# Patient Record
Sex: FEMALE | Race: WHITE | NOT HISPANIC OR LATINO | Employment: UNEMPLOYED | URBAN - METROPOLITAN AREA
[De-identification: names, ages, dates, MRNs, and addresses within clinical notes are randomized per-mention and may not be internally consistent; named-entity substitution may affect disease eponyms.]

---

## 2019-12-19 ENCOUNTER — HOSPITAL ENCOUNTER (EMERGENCY)
Facility: MEDICAL CENTER | Age: 34
End: 2019-12-19
Attending: EMERGENCY MEDICINE
Payer: COMMERCIAL

## 2019-12-19 VITALS
RESPIRATION RATE: 16 BRPM | SYSTOLIC BLOOD PRESSURE: 161 MMHG | OXYGEN SATURATION: 99 % | DIASTOLIC BLOOD PRESSURE: 98 MMHG | BODY MASS INDEX: 32.84 KG/M2 | WEIGHT: 204.37 LBS | TEMPERATURE: 97.2 F | HEART RATE: 82 BPM | HEIGHT: 66 IN

## 2019-12-19 DIAGNOSIS — R42 LIGHTHEADEDNESS: ICD-10-CM

## 2019-12-19 LAB
ALBUMIN SERPL BCP-MCNC: 4.2 G/DL (ref 3.2–4.9)
ALBUMIN/GLOB SERPL: 1.2 G/DL
ALP SERPL-CCNC: 49 U/L (ref 30–99)
ALT SERPL-CCNC: 21 U/L (ref 2–50)
ANION GAP SERPL CALC-SCNC: 16 MMOL/L (ref 0–11.9)
AST SERPL-CCNC: 19 U/L (ref 12–45)
BASOPHILS # BLD AUTO: 0.5 % (ref 0–1.8)
BASOPHILS # BLD: 0.05 K/UL (ref 0–0.12)
BILIRUB SERPL-MCNC: 0.3 MG/DL (ref 0.1–1.5)
BUN SERPL-MCNC: 12 MG/DL (ref 8–22)
CALCIUM SERPL-MCNC: 9.1 MG/DL (ref 8.4–10.2)
CHLORIDE SERPL-SCNC: 102 MMOL/L (ref 96–112)
CO2 SERPL-SCNC: 23 MMOL/L (ref 20–33)
CREAT SERPL-MCNC: 0.75 MG/DL (ref 0.5–1.4)
EKG IMPRESSION: NORMAL
EOSINOPHIL # BLD AUTO: 0.08 K/UL (ref 0–0.51)
EOSINOPHIL NFR BLD: 0.8 % (ref 0–6.9)
ERYTHROCYTE [DISTWIDTH] IN BLOOD BY AUTOMATED COUNT: 40.1 FL (ref 35.9–50)
GLOBULIN SER CALC-MCNC: 3.5 G/DL (ref 1.9–3.5)
GLUCOSE SERPL-MCNC: 97 MG/DL (ref 65–99)
HCG SERPL QL: NEGATIVE
HCT VFR BLD AUTO: 43.1 % (ref 37–47)
HGB BLD-MCNC: 14 G/DL (ref 12–16)
IMM GRANULOCYTES # BLD AUTO: 0.04 K/UL (ref 0–0.11)
IMM GRANULOCYTES NFR BLD AUTO: 0.4 % (ref 0–0.9)
LYMPHOCYTES # BLD AUTO: 2.24 K/UL (ref 1–4.8)
LYMPHOCYTES NFR BLD: 21.1 % (ref 22–41)
MCH RBC QN AUTO: 28.8 PG (ref 27–33)
MCHC RBC AUTO-ENTMCNC: 32.5 G/DL (ref 33.6–35)
MCV RBC AUTO: 88.7 FL (ref 81.4–97.8)
MONOCYTES # BLD AUTO: 0.72 K/UL (ref 0–0.85)
MONOCYTES NFR BLD AUTO: 6.8 % (ref 0–13.4)
NEUTROPHILS # BLD AUTO: 7.47 K/UL (ref 2–7.15)
NEUTROPHILS NFR BLD: 70.4 % (ref 44–72)
NRBC # BLD AUTO: 0 K/UL
NRBC BLD-RTO: 0 /100 WBC
PLATELET # BLD AUTO: 347 K/UL (ref 164–446)
PMV BLD AUTO: 10 FL (ref 9–12.9)
POTASSIUM SERPL-SCNC: 3.7 MMOL/L (ref 3.6–5.5)
PROT SERPL-MCNC: 7.7 G/DL (ref 6–8.2)
RBC # BLD AUTO: 4.86 M/UL (ref 4.2–5.4)
SODIUM SERPL-SCNC: 141 MMOL/L (ref 135–145)
WBC # BLD AUTO: 10.6 K/UL (ref 4.8–10.8)

## 2019-12-19 PROCEDURE — 85025 COMPLETE CBC W/AUTO DIFF WBC: CPT

## 2019-12-19 PROCEDURE — 36415 COLL VENOUS BLD VENIPUNCTURE: CPT

## 2019-12-19 PROCEDURE — 99284 EMERGENCY DEPT VISIT MOD MDM: CPT

## 2019-12-19 PROCEDURE — 84703 CHORIONIC GONADOTROPIN ASSAY: CPT

## 2019-12-19 PROCEDURE — 80053 COMPREHEN METABOLIC PANEL: CPT

## 2019-12-19 PROCEDURE — 93005 ELECTROCARDIOGRAM TRACING: CPT | Performed by: EMERGENCY MEDICINE

## 2019-12-19 PROCEDURE — 93005 ELECTROCARDIOGRAM TRACING: CPT

## 2019-12-19 SDOH — HEALTH STABILITY: MENTAL HEALTH: HOW OFTEN DO YOU HAVE A DRINK CONTAINING ALCOHOL?: MONTHLY OR LESS

## 2019-12-19 NOTE — ED NOTES
Pt given written and oral dc instructions. Pt verbalized understanding of all instructions given. All questions answered. VSS. IV removed. Pt given fu instructions and educated on s/s of when to return to the ER. Pt ambulating independently upon time of dc in stable condition.

## 2019-12-19 NOTE — ED PROVIDER NOTES
ED Provider Note    ED Provider Note    Primary care provider: No primary care provider on file.  Means of arrival: walk in  History obtained from: Patient    CHIEF COMPLAINT  Chief Complaint   Patient presents with   • Dizziness     Pt reports dizzyness and lightheadedness that started yesterday; pt states she had near syncopal event today   • Near Syncopal     Seen at 2:07 PM.   HPI  Miriam Cabrera is a 34 y.o. female who presents to the Emergency Department with episodes of lightheadedness.  Yesterday she had a brief episode of vertigo, described as room spinning.  Today she has had more lightheadedness as if she was going to pass out.  She has never had this in the past.  She feels that maybe she is dehydrated although she has been drinking a lot of water lately.  She does have a family history of diabetes but has never personally been diagnosed with diabetes.    She has been on OCPs for the past 4 months due to heavy menstrual cycles.  She continues to have irregular heavy menstrual cycles.  She noted a gradual onset of mild headache earlier today that is since resolved.  She denies any numbness, weakness, neck pain, visual changes, current headache, nausea, vomiting, chest pain, shortness of breath, abdominal pain.  She feels hot at times but has not had a fever.    REVIEW OF SYSTEMS  See HPI,   Remainder of ROS negative.     PAST MEDICAL HISTORY   Denies.    SURGICAL HISTORY  patient denies any surgical history    SOCIAL HISTORY  Social History     Tobacco Use   • Smoking status: Never Smoker   • Smokeless tobacco: Never Used   Substance Use Topics   • Alcohol use: Yes     Frequency: Monthly or less     Comment: socially   • Drug use: Never      Social History     Substance and Sexual Activity   Drug Use Never       FAMILY HISTORY  History reviewed. No pertinent family history.    CURRENT MEDICATIONS  Reviewed.  See Encounter Summary.     ALLERGIES  No Known Allergies    PHYSICAL EXAM  VITAL SIGNS: BP (!)  "161/98   Pulse 82   Temp 36.2 °C (97.2 °F)   Resp 16   Ht 1.676 m (5' 6\")   Wt 92.7 kg (204 lb 5.9 oz)   SpO2 99%   BMI 32.99 kg/m²   Constitutional: Awake, alert in no apparent distress.  HENT: Normocephalic, Bilateral external ears normal. Nose normal.   Eyes: Conjunctiva normal, non-icteric, EOMI.    Thorax & Lungs: Easy unlabored respirations, Clear to ascultation bilaterally.  Cardiovascular: Regular rate, Regular rhythm, No murmurs, rubs or gallops.  Abdomen:  Soft, nontender, nondistended, normal active bowel sounds.   :    Skin: Visualized skin is  Dry, No erythema, No rash.   Musculoskeletal:   No cyanosis, clubbing or edema.  Neurologic: Alert, Grossly non-focal.   Psychiatric: Normal affect, Normal mood  Lymphatic:  No cervical LAD    EKG   12 lead Interpreted by me  Rhythm:  Normal sinus rhythm   Rate: 82  Axis: normal  Ectopy: none  Conduction: normal  ST Segments: no acute change  T Waves: no acute change  Clinical Impression: Normal EKG without acute changes     RADIOLOGY  No orders to display         COURSE & MEDICAL DECISION MAKING  Pertinent Labs & Imaging studies reviewed. (See chart for details)    Differential diagnoses include but are not limited to: Dehydration, new onset diabetes, symptomatic anemia    2:07 PM - Medical record reviewed, no prior visits.     Decision Making:  This is a 34 y.o. year old female who presents with lightheadedness.  She initially had an episode of vertigo yesterday but none today.  The patient is neurologically intact.  She mostly complains of just lightheadedness/near syncope.  EKG is normal, she does not have any LVH or Brugada syndrome.  No QT prolongation.  She is hemodynamically stable, she is hypertensive but this should not cause near syncopal events.  Hemoglobin is normal, the patient does not have any leukocytosis or leftward shift, no signs of sepsis.  The patient is not pregnant.  She has normal renal function as well without " hyperglycemia.    At this point the cause of the patient's lightheadedness is not clear, possibly due to some mild dehydration.  She is on OCPs so I considered pulmonary embolus but the patient does not have any chest pain or shortness of breath, vitals again are reassuring.  I feel that pulmonary embolus is extremely unlikely at this time and do not feel that requires further investigation based on the presentation today.  I explained that if the patient has any syncope she needs to be reevaluated.    Discharge Medications:  There are no discharge medications for this patient.      The patient was discharged home (see d/c instructions) and parent was told to return immediately for any signs or symptoms listed, or any worsening at all.  The patient's parent verbally agreed to the discharge precautions and follow-up plan which is documented in EPIC.    The patient's blood pressure is elevated today. >120/80. I have referred them to primary care for follow up.       FINAL IMPRESSION  1. Lightheadedness

## 2019-12-19 NOTE — ED TRIAGE NOTES
"Miriam Cabrera 34 y.o. female   Chief Complaint   Patient presents with   • Dizziness     Pt reports dizzyness and lightheadedness that started yesterday; pt states she had near syncopal event today   • Near Syncopal     /109   Pulse 90   Temp 36.6 °C (97.9 °F) (Temporal)   Resp 18   Ht 1.676 m (5' 6\")   Wt 92.7 kg (204 lb 5.9 oz)   SpO2 97%   BMI 32.99 kg/m²     Pt returned to [facility] and educated on triage process. Advised to notify RN with changes or concerns.   Denies chest pain, sob. Reports a headache since she arrived to ER.   "

## 2020-08-28 ENCOUNTER — APPOINTMENT (OUTPATIENT)
Dept: RADIOLOGY | Facility: IMAGING CENTER | Age: 35
End: 2020-08-28
Attending: PHYSICIAN ASSISTANT
Payer: MEDICAID

## 2020-08-28 ENCOUNTER — OFFICE VISIT (OUTPATIENT)
Dept: URGENT CARE | Facility: CLINIC | Age: 35
End: 2020-08-28
Payer: MEDICAID

## 2020-08-28 VITALS
OXYGEN SATURATION: 97 % | SYSTOLIC BLOOD PRESSURE: 132 MMHG | HEIGHT: 66 IN | HEART RATE: 103 BPM | TEMPERATURE: 96.8 F | RESPIRATION RATE: 14 BRPM | DIASTOLIC BLOOD PRESSURE: 92 MMHG | WEIGHT: 200 LBS | BODY MASS INDEX: 32.14 KG/M2

## 2020-08-28 DIAGNOSIS — M10.9 ACUTE GOUT OF RIGHT HAND, UNSPECIFIED CAUSE: ICD-10-CM

## 2020-08-28 DIAGNOSIS — M79.641 RIGHT HAND PAIN: ICD-10-CM

## 2020-08-28 PROCEDURE — 99203 OFFICE O/P NEW LOW 30 MIN: CPT | Performed by: PHYSICIAN ASSISTANT

## 2020-08-28 PROCEDURE — 73130 X-RAY EXAM OF HAND: CPT | Mod: TC,RT | Performed by: PHYSICIAN ASSISTANT

## 2020-08-28 RX ORDER — METHYLPREDNISOLONE 4 MG/1
TABLET ORAL
Qty: 21 TAB | Refills: 0 | Status: SHIPPED | OUTPATIENT
Start: 2020-08-28 | End: 2022-04-04

## 2020-08-28 RX ORDER — INDOMETHACIN 50 MG/1
50 CAPSULE ORAL 3 TIMES DAILY
Qty: 60 CAP | Refills: 0 | Status: SHIPPED | OUTPATIENT
Start: 2020-08-28 | End: 2022-04-04

## 2020-08-28 ASSESSMENT — ENCOUNTER SYMPTOMS
DIAPHORESIS: 0
DIARRHEA: 0
HEADACHES: 0
FEVER: 0
VOMITING: 0
PALPITATIONS: 0
ABDOMINAL PAIN: 0
CHILLS: 0
COUGH: 0
SHORTNESS OF BREATH: 0
MYALGIAS: 0
SINUS PAIN: 0
TINGLING: 0
WEIGHT LOSS: 0
BLURRED VISION: 0
DIZZINESS: 0
SORE THROAT: 0
NAUSEA: 0
WHEEZING: 0

## 2020-08-28 ASSESSMENT — FIBROSIS 4 INDEX: FIB4 SCORE: 0.41

## 2020-08-28 NOTE — PROGRESS NOTES
Subjective:   Miriam Cabrera is a 35 y.o. female who presents for Other (right hand stiffness, x yesterday maybe for 4am started walking up in pain  )    HPI:  This is a very pleasant 35-year-old female presenting to the clinic with right hand pain and stiffness starting at 4 AM yesterday.  Patient denies any traumatic injury or inciting event.  The only event she can think of is she was playing horse shoes the day before however she did not feel any pain while playing.  She describes waking up at 4 AM yesterday with pain and swelling to her right second MCP joint.  She denies any loss of range of motion however she states it feels stiff to move.  She says she has mild associated swelling has not noticed any redness or bruising.  Pain is made worse with any pressure over her second MCP joint.  She denies any tingling or loss of sensation to the hand or digit.  She has tried ice with minimal relief.  She denies any previous injury.  She denies any past medical history significant for gout or arthritis.  She states she has had no change in her diet however she has been losing a lot of weight lately.    Review of Systems   Constitutional: Negative for chills, diaphoresis, fever, malaise/fatigue and weight loss.   HENT: Negative for congestion, sinus pain and sore throat.    Eyes: Negative for blurred vision.   Respiratory: Negative for cough, shortness of breath and wheezing.    Cardiovascular: Negative for chest pain and palpitations.   Gastrointestinal: Negative for abdominal pain, diarrhea, nausea and vomiting.   Musculoskeletal: Positive for joint pain (Right second MCP joint.  No trauma or inciting event.). Negative for myalgias.   Skin: Negative for rash.   Neurological: Negative for dizziness, tingling and headaches.       Medications:    • This patient does not have an active medication from one of the medication groupers.    Allergies: Patient has no known allergies.    Problem List: Miriam Cabrera does not  "have a problem list on file.    Surgical History:  No past surgical history on file.    Past Social Hx: Miriam Cabrera  reports that she has never smoked. She has never used smokeless tobacco. She reports current alcohol use. She reports that she does not use drugs.     Past Family Hx:  Miriam Cabrera family history is not on file.     Problem list, medications, and allergies reviewed by myself today in Epic.     Objective:     /92   Pulse (!) 103   Temp 36 °C (96.8 °F) (Temporal)   Resp 14   Ht 1.676 m (5' 6\")   Wt 90.7 kg (200 lb)   SpO2 97%   BMI 32.28 kg/m²     Physical Exam  Constitutional:       General: She is not in acute distress.     Appearance: Normal appearance. She is not ill-appearing, toxic-appearing or diaphoretic.   HENT:      Head: Normocephalic and atraumatic.   Eyes:      Conjunctiva/sclera: Conjunctivae normal.   Cardiovascular:      Rate and Rhythm: Normal rate and regular rhythm.      Pulses: Normal pulses.      Heart sounds: Normal heart sounds.   Pulmonary:      Effort: Pulmonary effort is normal.      Breath sounds: Normal breath sounds. No wheezing.   Musculoskeletal: Normal range of motion.      Right hand: She exhibits tenderness, bony tenderness (Over the second MCP joint.) and swelling. She exhibits normal range of motion and normal capillary refill. Decreased sensation noted. Normal strength noted.        Hands:       Comments: Very minimal overlying erythema to the right hand her second MCP joint.  No ascending redness.  No signs of infection.  No tenderness to palpation over the distal portion of the phalanx or the metacarpal.   Skin:     Capillary Refill: Capillary refill takes less than 2 seconds.   Neurological:      General: No focal deficit present.      Mental Status: She is alert and oriented to person, place, and time. Mental status is at baseline.       X-ray right hand:  FINDINGS: Bone mineralization is normal. There is no evidence of fracture or " dislocation. Soft tissues are normal.     IMPRESSION:  No evidence of acute fracture or dislocation.    Assessment/Plan:     Diagnosis and associated orders:   1. Right hand pain    - DX-HAND 3+ RIGHT; Future  - methylPREDNISolone (MEDROL DOSEPAK) 4 MG Tablet Therapy Pack; Follow schedule on package instructions.  Dispense: 21 Tab; Refill: 0  - indomethacin (INDOCIN) 50 MG Cap; Take 1 Cap by mouth 3 times a day.  Dispense: 60 Cap; Refill: 0    2. Acute gout of right hand, unspecified cause    - methylPREDNISolone (MEDROL DOSEPAK) 4 MG Tablet Therapy Pack; Follow schedule on package instructions.  Dispense: 21 Tab; Refill: 0  - indomethacin (INDOCIN) 50 MG Cap; Take 1 Cap by mouth 3 times a day.  Dispense: 60 Cap; Refill: 0       Comments/MDM:     Differential diagnoses and treatment options were discussed with the patient at length today.  At this time differentials include acute gout flare versus overuse injury versus muscle strain versus arthritis.  X-rays performed in clinic reveal no acute fracture, bony abnormality or dislocation.  We will begin treatment with anti-inflammatories.  I recommended elevation and ice as much as possible.  Discussed potential red flag symptoms and signs of underlying infection.  If any of these present return to the clinic or nearest emergency department for further work-up and evaluation.           Differential diagnosis, natural history, supportive care, and indications for immediate follow-up discussed.    Advised the patient to follow-up with the primary care physician for recheck, reevaluation, and consideration of further management.    Please note that this dictation was created using voice recognition software. I have made reasonable attempt to correct obvious errors, but I expect that there are errors of grammar and possibly content that I did not discover before finalizing the note.    This note was electronically signed by BIBIANA Woodward PA-C

## 2020-10-03 ENCOUNTER — TELEMEDICINE (OUTPATIENT)
Dept: TELEHEALTH | Facility: TELEMEDICINE | Age: 35
End: 2020-10-03
Payer: MEDICAID

## 2020-10-03 VITALS — WEIGHT: 203 LBS | HEIGHT: 66 IN | BODY MASS INDEX: 32.62 KG/M2

## 2020-10-03 DIAGNOSIS — K04.7 DENTAL INFECTION: ICD-10-CM

## 2020-10-03 PROCEDURE — 99213 OFFICE O/P EST LOW 20 MIN: CPT | Mod: 95,CR | Performed by: NURSE PRACTITIONER

## 2020-10-03 RX ORDER — AMOXICILLIN AND CLAVULANATE POTASSIUM 875; 125 MG/1; MG/1
1 TABLET, FILM COATED ORAL 2 TIMES DAILY
Qty: 20 TAB | Refills: 0 | Status: SHIPPED | OUTPATIENT
Start: 2020-10-03 | End: 2020-10-13

## 2020-10-03 ASSESSMENT — FIBROSIS 4 INDEX: FIB4 SCORE: 0.41

## 2020-10-03 NOTE — PROGRESS NOTES
"Virtual Visit: Established Patient   This visit was conducted via Zoom using secure and encrypted videoconferencing technology. The patient was in a private location in the state of [place].    The patient's identity was confirmed and verbal consent was obtained for this virtual visit.    Subjective:   CC: Dental pain and facial swelling    Miriam Cabrera is a 35 y.o. female presenting for evaluation and management of: 1 day of tooth pain as well as right-sided facial pain and swelling.  Patient is taken over-the-counter ibuprofen with no relief of symptoms.  Denies any drainage.  Does state that she broke her tooth off and has been bothering her but the last day she is noticed that the pain has increased and the swelling is not present.  Patient denies fever, chills, nausea or vomiting.    ROS  Denies any recent fevers or chills. No nausea or vomiting. No chest pains or shortness of breath.     No Known Allergies    Current medicines (including changes today)  Current Outpatient Medications   Medication Sig Dispense Refill   • amoxicillin-clavulanate (AUGMENTIN) 875-125 MG Tab Take 1 Tab by mouth 2 times a day for 10 days. 20 Tab 0   • methylPREDNISolone (MEDROL DOSEPAK) 4 MG Tablet Therapy Pack Follow schedule on package instructions. 21 Tab 0   • indomethacin (INDOCIN) 50 MG Cap Take 1 Cap by mouth 3 times a day. 60 Cap 0     No current facility-administered medications for this visit.        There are no active problems to display for this patient.      History reviewed. No pertinent family history.    She  has no past medical history on file.  She  has no past surgical history on file.       Objective:   Ht 1.676 m (5' 6\")   Wt 92.1 kg (203 lb)   BMI 32.77 kg/m²     Physical Exam:  Constitutional: Alert, no distress, well-groomed.  Skin: No rashes in visible areas.  Eye: Round. Conjunctiva clear, lids normal. No icterus.   ENMT: Lips pink without lesions, good dentition, moist mucous membranes. Phonation " normal. Tooth seven broken with discoloration, right side of face with noticeable STS localized to the right side  Neck: No masses, no thyromegaly. Moves freely without pain.  Respiratory: Unlabored respiratory effort, no cough or audible wheeze  Psych: Alert and oriented x3, normal affect and mood.       Assessment and Plan:   The following treatment plan was discussed:     1. Dental infection  - amoxicillin-clavulanate (AUGMENTIN) 875-125 MG Tab; Take 1 Tab by mouth 2 times a day for 10 days.  Dispense: 20 Tab; Refill: 0    Discussed physical examination findings as well as patient presentation is consistent with possible dental infection.  Will provide patient with antibiotics to take twice a day for 10 days.  Advised her to finish all antibiotics as prescribed.  Also advised to follow-up with dentist and provided information for [facility] as well as [facility] for her insurance.  Discussed abortive care including over-the-counter NSAIDs and Tylenol per 's instructions, soft foods as well as foods mild temperature.    Supportive care, differential diagnoses, and indications for immediate follow-up discussed with patient.    Pathogenesis of diagnosis discussed including typical length and natural progression. Patient expresses understanding and agrees to plan.    Instructed patient to return to clinic for worsening symptoms or symptoms that persist for 7 to 10 days     Please note that this dictation was created using voice recognition software. I have made every reasonable attempt to correct obvious errors, but I expect that there are errors of grammar and possibly content that I did not discover before finalizing the note.    Note electronically signed by ADORE Pearson         Follow-up: No follow-ups on file.

## 2021-04-04 ENCOUNTER — OFFICE VISIT (OUTPATIENT)
Dept: URGENT CARE | Facility: CLINIC | Age: 36
End: 2021-04-04
Payer: MEDICAID

## 2021-04-04 ENCOUNTER — HOSPITAL ENCOUNTER (OUTPATIENT)
Facility: MEDICAL CENTER | Age: 36
End: 2021-04-04
Attending: PHYSICIAN ASSISTANT
Payer: MEDICAID

## 2021-04-04 ENCOUNTER — SUPERVISING PHYSICIAN REVIEW (OUTPATIENT)
Dept: URGENT CARE | Facility: CLINIC | Age: 36
End: 2021-04-04

## 2021-04-04 VITALS
HEART RATE: 111 BPM | HEIGHT: 56 IN | TEMPERATURE: 97.5 F | DIASTOLIC BLOOD PRESSURE: 70 MMHG | SYSTOLIC BLOOD PRESSURE: 130 MMHG | BODY MASS INDEX: 46.65 KG/M2 | OXYGEN SATURATION: 96 % | WEIGHT: 207.4 LBS

## 2021-04-04 DIAGNOSIS — G44.89 OTHER HEADACHE SYNDROME: ICD-10-CM

## 2021-04-04 DIAGNOSIS — R50.9 FEVER, UNSPECIFIED FEVER CAUSE: ICD-10-CM

## 2021-04-04 DIAGNOSIS — J00 ACUTE RHINITIS: ICD-10-CM

## 2021-04-04 LAB — COVID ORDER STATUS COVID19: NORMAL

## 2021-04-04 PROCEDURE — U0003 INFECTIOUS AGENT DETECTION BY NUCLEIC ACID (DNA OR RNA); SEVERE ACUTE RESPIRATORY SYNDROME CORONAVIRUS 2 (SARS-COV-2) (CORONAVIRUS DISEASE [COVID-19]), AMPLIFIED PROBE TECHNIQUE, MAKING USE OF HIGH THROUGHPUT TECHNOLOGIES AS DESCRIBED BY CMS-2020-01-R: HCPCS

## 2021-04-04 PROCEDURE — U0005 INFEC AGEN DETEC AMPLI PROBE: HCPCS

## 2021-04-04 PROCEDURE — 99213 OFFICE O/P EST LOW 20 MIN: CPT | Performed by: PHYSICIAN ASSISTANT

## 2021-04-04 ASSESSMENT — ENCOUNTER SYMPTOMS
NAUSEA: 0
MYALGIAS: 0
ABDOMINAL PAIN: 0
COUGH: 0
DIZZINESS: 0
SHORTNESS OF BREATH: 0
SORE THROAT: 1
CHILLS: 0
FEVER: 0
HEADACHES: 1
VOMITING: 0
CONSTIPATION: 0
DIARRHEA: 0
EYE PAIN: 0

## 2021-04-04 ASSESSMENT — FIBROSIS 4 INDEX: FIB4 SCORE: 0.41

## 2021-04-04 NOTE — PROGRESS NOTES
"Subjective:   Miriam Cabrera is a 35 y.o. female who presents for Pharyngitis (x 3 days, yellow mucous), Earache ( x2 days), and Headache (head pounding, pressure)      HPI:  This is a pleasant 35-year-old female who presents complaining of 3 days of upper respiratory symptoms including but not limited to mild sore throat, increase in sinus drainage and yellow mucus production, mild bilateral earache, generalized headache described as pounding and pressure.  She has no recent sick contacts, no recent travel.  She is been taking OTC meds with partial relief.  She is not noticed any fevers or chills, cough, shortness of breath or chest pain    Review of Systems   Constitutional: Positive for malaise/fatigue. Negative for chills and fever.   HENT: Positive for congestion, ear pain and sore throat. Negative for hearing loss and tinnitus.    Eyes: Negative for pain.   Respiratory: Negative for cough and shortness of breath.    Cardiovascular: Negative for chest pain.   Gastrointestinal: Negative for abdominal pain, constipation, diarrhea, nausea and vomiting.   Genitourinary: Negative for dysuria.   Musculoskeletal: Negative for myalgias.   Skin: Negative for rash.   Neurological: Positive for headaches. Negative for dizziness.       Medications:    •     Allergies: Patient has no known allergies.    Problem List: Miriam Cabrera does not have a problem list on file.    Surgical History:  No past surgical history on file.    Past Social Hx: Miriam Cabrera  reports that she has never smoked. She has never used smokeless tobacco. She reports current alcohol use. She reports that she does not use drugs.     Past Family Hx:  Miriam Cabrera family history is not on file.     Problem list, medications, and allergies reviewed by myself today in Epic.     Objective:     /70 (BP Location: Left arm, Patient Position: Sitting, BP Cuff Size: Adult)   Pulse (!) 111   Temp 36.4 °C (97.5 °F) (Temporal)   Ht 1.422 m (4' 8\")  "  Wt 94.1 kg (207 lb 6.4 oz)   SpO2 96%   BMI 46.50 kg/m²     Physical Exam  Vitals reviewed.   Constitutional:       Appearance: Normal appearance. She is not ill-appearing or toxic-appearing.   HENT:      Head: Normocephalic and atraumatic.      Right Ear: Tympanic membrane, ear canal and external ear normal.      Left Ear: Tympanic membrane, ear canal and external ear normal.      Nose: Congestion and rhinorrhea present.      Mouth/Throat:      Mouth: Mucous membranes are moist.      Pharynx: No oropharyngeal exudate or posterior oropharyngeal erythema.      Comments: POST NASAL DRIP  Eyes:      Conjunctiva/sclera: Conjunctivae normal.   Cardiovascular:      Rate and Rhythm: Normal rate and regular rhythm.   Pulmonary:      Effort: Pulmonary effort is normal.      Breath sounds: Normal breath sounds.   Lymphadenopathy:      Cervical: No cervical adenopathy.   Skin:     General: Skin is warm and dry.      Capillary Refill: Capillary refill takes less than 2 seconds.   Neurological:      Mental Status: She is alert and oriented to person, place, and time.         Assessment/Plan:     Diagnosis and associated orders:     1. Acute rhinitis  COVID/SARS CoV-2 PCR   2. Fever, unspecified fever cause  COVID/SARS CoV-2 PCR   3. Other headache syndrome  COVID/SARS CoV-2 PCR      Comments/MDM:     Patient's vital signs are reassuring and they appear hemodynamically stable and do not require higher level care at this time  I discussed self isolation and provided printed instructions (if applicable)  I discussed ER precautions and provided printed instructions (if applicable)  I educated the patient on possibility of a false-negative test and indications for repeat testing  I instructed the patient to try to follow up with their PCP (if applicable) for follow up care  I provided the patient the printed AVS which contains information about signing up for [facility]   I will contact the patient via [facility] [employer] with Covid  results.  If requested, I provided the patient with a work note to provide to their employer or school regarding returning to work and discontinuation of self isolation.  All questions were answered and patient demonstrated verbal understanding of above.  I followed all reasonable PPE precautions during this encounter including but not limited to use of an N95 mask, gloves, and gown if indicated.             Differential diagnosis, natural history, supportive care, and indications for immediate follow-up discussed.    Advised the patient to follow-up with the primary care physician for recheck, reevaluation, and consideration of further management.    Please note that this dictation was created using voice recognition software. I have made a reasonable attempt to correct obvious errors, but I expect that there are errors of grammar and possibly content that I did not discover before finalizing the note.    This note was electronically signed by Keith Fletcher PA-C

## 2021-04-05 LAB
SARS-COV-2 RNA RESP QL NAA+PROBE: NOTDETECTED
SPECIMEN SOURCE: NORMAL

## 2021-04-05 NOTE — PROGRESS NOTES
I have reviewed and agree with history, assessment and plan for office encounter on 4/4/2021 with Advanced Practice Provider: Keith SCHAFER.  Face to face encounter/direct observation: No  Suggested changes to plan or follow-up: none   Michael Upton M.D.

## 2021-08-26 ENCOUNTER — TELEPHONE (OUTPATIENT)
Dept: SCHEDULING | Facility: IMAGING CENTER | Age: 36
End: 2021-08-26

## 2022-04-04 ENCOUNTER — OFFICE VISIT (OUTPATIENT)
Dept: URGENT CARE | Facility: CLINIC | Age: 37
End: 2022-04-04
Payer: MEDICAID

## 2022-04-04 VITALS
WEIGHT: 221.2 LBS | OXYGEN SATURATION: 98 % | DIASTOLIC BLOOD PRESSURE: 84 MMHG | TEMPERATURE: 96.5 F | HEART RATE: 76 BPM | RESPIRATION RATE: 16 BRPM | BODY MASS INDEX: 35.55 KG/M2 | SYSTOLIC BLOOD PRESSURE: 128 MMHG | HEIGHT: 66 IN

## 2022-04-04 DIAGNOSIS — K04.7 DENTAL INFECTION: ICD-10-CM

## 2022-04-04 DIAGNOSIS — K08.89 PAIN, DENTAL: ICD-10-CM

## 2022-04-04 PROCEDURE — 99214 OFFICE O/P EST MOD 30 MIN: CPT | Performed by: PHYSICIAN ASSISTANT

## 2022-04-04 RX ORDER — AMOXICILLIN 500 MG/1
500 CAPSULE ORAL 3 TIMES DAILY
Qty: 21 CAPSULE | Refills: 0 | Status: SHIPPED | OUTPATIENT
Start: 2022-04-04 | End: 2022-04-11

## 2022-04-04 RX ORDER — HYDROCODONE BITARTRATE AND ACETAMINOPHEN 5; 325 MG/1; MG/1
1 TABLET ORAL EVERY 8 HOURS PRN
Qty: 12 TABLET | Refills: 0 | Status: SHIPPED | OUTPATIENT
Start: 2022-04-04 | End: 2022-04-08

## 2022-04-04 NOTE — PROGRESS NOTES
"  Subjective:   Miriam Cabrera is a 36 y.o. female who presents today with   Chief Complaint   Patient presents with   • Oral Swelling     Tooth pain, filing fell out months ago, left upper mid part of the mouth, swollen x 2 weeks        Dental Pain   This is a new problem. The current episode started 1 to 4 weeks ago. The problem occurs constantly. The problem has been unchanged. The pain is moderate. She has tried acetaminophen and NSAIDs for the symptoms. The treatment provided no relief.   Patient states she lost a filling couple months ago and has insurance that is not going to kick in until May and cannot be seen by dentist in the meantime as Medicaid does not cover it.  Pain has been getting more unbearable over the last week or so.    PMH:  has no past medical history on file.  MEDS:   Current Outpatient Medications:   •  HYDROcodone-acetaminophen (NORCO) 5-325 MG Tab per tablet, Take 1 Tablet by mouth every 8 hours as needed for up to 4 days., Disp: 12 Tablet, Rfl: 0  •  amoxicillin (AMOXIL) 500 MG Cap, Take 1 Capsule by mouth 3 times a day for 7 days., Disp: 21 Capsule, Rfl: 0  ALLERGIES: No Known Allergies  SURGHX: History reviewed. No pertinent surgical history.  SOCHX:  reports that she has never smoked. She has never used smokeless tobacco. She reports current alcohol use. She reports that she does not use drugs.  FH: Reviewed with patient, not pertinent to this visit.       Review of Systems   HENT:        Dental pain        Objective:   /84 (BP Location: Left arm, Patient Position: Sitting, BP Cuff Size: Adult)   Pulse 76   Temp 35.8 °C (96.5 °F) (Temporal)   Resp 16   Ht 1.676 m (5' 6\")   Wt 100 kg (221 lb 3.2 oz)   SpO2 98%   BMI 35.70 kg/m²   Physical Exam  Vitals and nursing note reviewed.   Constitutional:       General: She is not in acute distress.     Appearance: Normal appearance. She is well-developed. She is not ill-appearing or toxic-appearing.   HENT:      Head: " Normocephalic and atraumatic.        Comments: Mild left facial swelling.     Right Ear: Hearing normal.      Left Ear: Hearing normal.      Mouth/Throat:        Comments: Filling is missing to the left upper tooth.  Swelling and erythema surrounding the tooth and tenderness to palpation.  Eyes:      Pupils: Pupils are equal, round, and reactive to light.   Cardiovascular:      Rate and Rhythm: Normal rate.   Pulmonary:      Effort: Pulmonary effort is normal.   Musculoskeletal:      Comments: Normal movement in all 4 extremities   Skin:     General: Skin is warm and dry.   Neurological:      Mental Status: She is alert.      Coordination: Coordination normal.   Psychiatric:         Mood and Affect: Mood normal.           Assessment/Plan:   Assessment    1. Pain, dental  - HYDROcodone-acetaminophen (NORCO) 5-325 MG Tab per tablet; Take 1 Tablet by mouth every 8 hours as needed for up to 4 days.  Dispense: 12 Tablet; Refill: 0    2. Dental infection  - HYDROcodone-acetaminophen (NORCO) 5-325 MG Tab per tablet; Take 1 Tablet by mouth every 8 hours as needed for up to 4 days.  Dispense: 12 Tablet; Refill: 0  - amoxicillin (AMOXIL) 500 MG Cap; Take 1 Capsule by mouth 3 times a day for 7 days.  Dispense: 21 Capsule; Refill: 0    Other orders  - Consent for Opiate Prescription  Symptoms and presentation are consistent with dental infection at this time.  We will treat accordingly with antibiotics.  Patient states ibuprofen and Tylenol have not been helpful for her and she is asking for pain medication today to help with her pain.  Discussed with patient possible side effects of medication and she is fully understanding and agreeable.  She will only use the pain medication as prescribed sparingly if over-the-counter medication is not helping and not before driving or working..  She is understanding of possible drowsiness side effects of medication.  In prescribing controlled substances to this patient, I certify that I  have obtained and reviewed the medical history of Miriam Cabrera. I have also made a good parminder effort to obtain applicable records from other providers who have treated the patient and records did not demonstrate any increased risk of substance abuse that would prevent me from prescribing controlled substances.     I have conducted a physical exam and documented it. I have reviewed Ms. Cabrera’s prescription history as maintained by the [place] Prescription Monitoring Program.     I have assessed the patient’s risk for abuse, dependency, and addiction using the validated Opioid Risk Tool available at https://www.mdcalc.com/dnsyim-pafz-vuey-ort-narcotic-abuse.     Given the above, I believe the benefits of controlled substance therapy outweigh the risks. The reasons for prescribing controlled substances include non-narcotic, oral analgesic alternatives have been inadequate for pain control. Accordingly, I have discussed the risk and benefits, treatment plan, and alternative therapies with the patient.     Differential diagnosis, natural history, supportive care, and indications for immediate follow-up discussed.   Patient given instructions and understanding of medications and treatment.    If not improving in 3-5 days, F/U with PCP or return to  if symptoms worsen.    Patient agreeable to plan.      Please note that this dictation was created using voice recognition software. I have made every reasonable attempt to correct obvious errors, but I expect that there are errors of grammar and possibly content that I did not discover before finalizing the note.    Shaheen Hernández PA-C

## 2022-08-12 ENCOUNTER — NON-PROVIDER VISIT (OUTPATIENT)
Dept: OCCUPATIONAL MEDICINE | Facility: CLINIC | Age: 37
End: 2022-08-12

## 2022-08-12 DIAGNOSIS — Z02.89 ENCOUNTER FOR OCCUPATIONAL HEALTH ASSESSMENT: Primary | ICD-10-CM

## 2022-08-14 ENCOUNTER — NON-PROVIDER VISIT (OUTPATIENT)
Dept: URGENT CARE | Facility: CLINIC | Age: 37
End: 2022-08-14
Payer: MEDICAID

## 2022-08-14 LAB — TB WHEAL 3D P 5 TU DIAM: NORMAL MM

## 2022-12-15 ENCOUNTER — OFFICE VISIT (OUTPATIENT)
Dept: URGENT CARE | Facility: CLINIC | Age: 37
End: 2022-12-15
Payer: COMMERCIAL

## 2022-12-15 VITALS
HEIGHT: 66 IN | BODY MASS INDEX: 34.75 KG/M2 | TEMPERATURE: 97.6 F | DIASTOLIC BLOOD PRESSURE: 80 MMHG | RESPIRATION RATE: 18 BRPM | OXYGEN SATURATION: 98 % | WEIGHT: 216.2 LBS | SYSTOLIC BLOOD PRESSURE: 126 MMHG | HEART RATE: 84 BPM

## 2022-12-15 DIAGNOSIS — K04.7 DENTAL INFECTION: ICD-10-CM

## 2022-12-15 DIAGNOSIS — K02.9 DENTAL DECAY: ICD-10-CM

## 2022-12-15 DIAGNOSIS — J06.9 URI WITH COUGH AND CONGESTION: ICD-10-CM

## 2022-12-15 PROCEDURE — 99214 OFFICE O/P EST MOD 30 MIN: CPT

## 2022-12-15 RX ORDER — AMOXICILLIN AND CLAVULANATE POTASSIUM 875; 125 MG/1; MG/1
1 TABLET, FILM COATED ORAL 2 TIMES DAILY
Qty: 14 TABLET | Refills: 0 | Status: SHIPPED | OUTPATIENT
Start: 2022-12-15 | End: 2022-12-22

## 2022-12-15 RX ORDER — METHYLPREDNISOLONE 4 MG/1
TABLET ORAL
Qty: 21 TABLET | Refills: 0 | Status: SHIPPED | OUTPATIENT
Start: 2022-12-15 | End: 2023-01-24

## 2022-12-15 RX ORDER — BENZONATATE 100 MG/1
100 CAPSULE ORAL 3 TIMES DAILY PRN
Qty: 30 CAPSULE | Refills: 0 | Status: SHIPPED | OUTPATIENT
Start: 2022-12-15 | End: 2023-01-24

## 2022-12-15 RX ORDER — ACETAMINOPHEN AND CODEINE PHOSPHATE 120; 12 MG/5ML; MG/5ML
0.35 SOLUTION ORAL DAILY
COMMUNITY
Start: 2022-07-15 | End: 2023-01-24

## 2022-12-15 ASSESSMENT — ENCOUNTER SYMPTOMS
SINUS PAIN: 1
COUGH: 1
WEIGHT LOSS: 0
STRIDOR: 0
CHILLS: 0
SPUTUM PRODUCTION: 0
WHEEZING: 0
SORE THROAT: 0
HEMOPTYSIS: 0
DIAPHORESIS: 0
FEVER: 0
SHORTNESS OF BREATH: 0

## 2022-12-15 NOTE — PROGRESS NOTES
Subjective:   Miriam Cabrera is a 37 y.o. female who presents for Pharyngitis (X1WEEK/DRY COUGH/CLEAR/YELLOW PHLEGM/UPPER TOOTH PAIN/)      HPI:  #1 URI- this is a 37-year-old female who presents today for URI symptoms.  Patient reports developing associated cough, nasal congestion, facial congestion x8 days.  Patient reports using DayQuil, NyQuil, and vitamin C without any improvement in symptoms.  She reports cough is dry and has kept her up at night.  She reports working at a  with several sick exposures.  She denies fevers, chills, body aches.  No shortness of breath or wheezing.  No smoking history.    #2 dental pain- patient reports dental pain over the last 6 months.  She reports recently receiving medical benefits and is scheduled for root canal.  She reports swelling and pain to her tooth increased over the last few days.  She reports pain is 2\10 at this time.  She reports associated gum swelling.  She has taken over-the-counter pain relievers for this.      Review of Systems   Constitutional:  Negative for chills, diaphoresis, fever, malaise/fatigue and weight loss.   HENT:  Positive for congestion and sinus pain. Negative for ear discharge, ear pain and sore throat.         Positive for dental pain   Respiratory:  Positive for cough. Negative for hemoptysis, sputum production, shortness of breath, wheezing and stridor.      Medications:    Current Outpatient Medications on File Prior to Visit   Medication Sig Dispense Refill    norethindrone (MICRONOR) 0.35 MG tablet Take 0.35 mg by mouth every day.       No current facility-administered medications on file prior to visit.        Allergies:   Patient has no known allergies.    Problem List:   There is no problem list on file for this patient.       Surgical History:  No past surgical history on file.    Past Social Hx:   Social History     Tobacco Use    Smoking status: Never    Smokeless tobacco: Never   Vaping Use    Vaping Use: Never used  "  Substance Use Topics    Alcohol use: Not Currently     Comment: socially    Drug use: Never          Problem list, medications, and allergies reviewed by myself today in Epic.     Objective:     /80 (BP Location: Left arm, Patient Position: Sitting)   Pulse 84   Temp 36.4 °C (97.6 °F) (Temporal)   Resp 18   Ht 1.676 m (5' 6\")   Wt 98.1 kg (216 lb 3.2 oz)   LMP 12/05/2022 (Exact Date)   SpO2 98%   BMI 34.90 kg/m²     Physical Exam  Vitals and nursing note reviewed.   Constitutional:       General: She is awake. She is not in acute distress.     Appearance: Normal appearance. She is well-developed and normal weight. She is not ill-appearing, toxic-appearing or diaphoretic.   HENT:      Head: Normocephalic and atraumatic.      Right Ear: Tympanic membrane, ear canal and external ear normal. There is no impacted cerumen.      Left Ear: Tympanic membrane, ear canal and external ear normal. There is no impacted cerumen.      Nose: Congestion present.      Mouth/Throat:      Mouth: Mucous membranes are moist.      Dentition: Abnormal dentition. Dental tenderness, gingival swelling and dental caries present. No dental abscesses.      Pharynx: Oropharynx is clear. Uvula midline. No oropharyngeal exudate or posterior oropharyngeal erythema.      Tonsils: No tonsillar exudate. 0 on the right. 0 on the left.     Cardiovascular:      Rate and Rhythm: Normal rate and regular rhythm.      Pulses: Normal pulses.      Heart sounds: Normal heart sounds. No murmur heard.    No friction rub. No gallop.   Pulmonary:      Effort: Pulmonary effort is normal. No respiratory distress.      Breath sounds: Normal breath sounds. No stridor. No wheezing, rhonchi or rales.   Chest:      Chest wall: No tenderness.   Musculoskeletal:      Cervical back: Neck supple. No tenderness.   Lymphadenopathy:      Cervical: No cervical adenopathy.   Skin:     General: Skin is warm and dry.      Capillary Refill: Capillary refill takes less " than 2 seconds.   Neurological:      General: No focal deficit present.      Mental Status: She is alert and oriented to person, place, and time. Mental status is at baseline.      Cranial Nerves: No cranial nerve deficit.      Motor: No weakness.      Gait: Gait normal.   Psychiatric:         Mood and Affect: Mood normal.         Behavior: Behavior normal. Behavior is cooperative.         Thought Content: Thought content normal.         Judgment: Judgment normal.       Assessment/Plan:     Diagnosis and associated orders:   1. Dental infection        2. Dental decay  amoxicillin-clavulanate (AUGMENTIN) 875-125 MG Tab      3. URI with cough and congestion  benzonatate (TESSALON) 100 MG Cap    methylPREDNISolone (MEDROL DOSEPAK) 4 MG Tablet Therapy Pack          Comments/MDM:   Pt is clinically stable at today's acute urgent care visit.  No acute distress noted. Appropriate for outpatient management at this time.     Acute problem.  Patient is not ill or toxic appearing clinic.  Vital signs are stable.  Physical exam findings consistent with viral URI.  Patient will be prescribed Tessalon for cough and Medrol Dosepak.  Patient noted to have broken tooth and dental decay.  Patient we will treated with 7-day course of Augmentin.  Advised patient to begin taking medications as prescribed, increase oral hydration to include warm fluids, alternate Tylenol ibuprofen as needed for pain, and follow-up with dentist as scheduled.  She is to return for any new or worsening signs or symptoms and follow-up with PCP.  Patient agreeable plan of care verbalizes good understanding.           Discussed DDx, management options (risks,benefits, and alternatives to planned treatment), natural progression and supportive care.  Expressed understanding and the treatment plan was agreed upon. Questions were encouraged and answered   Return to urgent care prn if new or worsening sx or if there is no improvement in condition prn.    Educated  in Red flags and indications to immediately call 911 or present to the Emergency Department.   Advised the patient to follow-up with the primary care physician for recheck, reevaluation, and consideration of further management.    I personally reviewed prior external notes and test results pertinent to today's visit.  I have independently reviewed and interpreted all diagnostics ordered during this urgent care acute visit.         Please note that this dictation was created using voice recognition software. I have made a reasonable attempt to correct obvious errors, but I expect that there are errors of grammar and possibly content that I did not discover before finalizing the note.    This note was electronically signed by ADORE Villafana

## 2022-12-18 ENCOUNTER — HOSPITAL ENCOUNTER (EMERGENCY)
Facility: MEDICAL CENTER | Age: 37
End: 2022-12-18
Attending: STUDENT IN AN ORGANIZED HEALTH CARE EDUCATION/TRAINING PROGRAM
Payer: COMMERCIAL

## 2022-12-18 ENCOUNTER — APPOINTMENT (OUTPATIENT)
Dept: RADIOLOGY | Facility: MEDICAL CENTER | Age: 37
End: 2022-12-18
Attending: STUDENT IN AN ORGANIZED HEALTH CARE EDUCATION/TRAINING PROGRAM
Payer: COMMERCIAL

## 2022-12-18 VITALS
BODY MASS INDEX: 35.03 KG/M2 | DIASTOLIC BLOOD PRESSURE: 101 MMHG | HEART RATE: 84 BPM | TEMPERATURE: 97.2 F | WEIGHT: 218 LBS | SYSTOLIC BLOOD PRESSURE: 168 MMHG | RESPIRATION RATE: 20 BRPM | OXYGEN SATURATION: 95 % | HEIGHT: 66 IN

## 2022-12-18 DIAGNOSIS — R03.0 ELEVATED BLOOD PRESSURE READING: ICD-10-CM

## 2022-12-18 DIAGNOSIS — M62.838 NECK MUSCLE SPASM: ICD-10-CM

## 2022-12-18 DIAGNOSIS — R73.9 BLOOD GLUCOSE ELEVATED: ICD-10-CM

## 2022-12-18 DIAGNOSIS — R07.89 OTHER CHEST PAIN: ICD-10-CM

## 2022-12-18 LAB
ALBUMIN SERPL BCP-MCNC: 4.5 G/DL (ref 3.2–4.9)
ALBUMIN/GLOB SERPL: 1.4 G/DL
ALP SERPL-CCNC: 51 U/L (ref 30–99)
ALT SERPL-CCNC: 42 U/L (ref 2–50)
ANION GAP SERPL CALC-SCNC: 16 MMOL/L (ref 7–16)
AST SERPL-CCNC: 27 U/L (ref 12–45)
BASOPHILS # BLD AUTO: 0.2 % (ref 0–1.8)
BASOPHILS # BLD: 0.03 K/UL (ref 0–0.12)
BILIRUB SERPL-MCNC: 0.2 MG/DL (ref 0.1–1.5)
BUN SERPL-MCNC: 14 MG/DL (ref 8–22)
CALCIUM ALBUM COR SERPL-MCNC: 9.1 MG/DL (ref 8.5–10.5)
CALCIUM SERPL-MCNC: 9.5 MG/DL (ref 8.5–10.5)
CHLORIDE SERPL-SCNC: 101 MMOL/L (ref 96–112)
CO2 SERPL-SCNC: 20 MMOL/L (ref 20–33)
CREAT SERPL-MCNC: 0.82 MG/DL (ref 0.5–1.4)
D DIMER PPP IA.FEU-MCNC: <0.27 UG/ML (FEU) (ref 0–0.5)
EKG IMPRESSION: NORMAL
EKG IMPRESSION: NORMAL
EOSINOPHIL # BLD AUTO: 0.01 K/UL (ref 0–0.51)
EOSINOPHIL NFR BLD: 0.1 % (ref 0–6.9)
ERYTHROCYTE [DISTWIDTH] IN BLOOD BY AUTOMATED COUNT: 39.9 FL (ref 35.9–50)
GFR SERPLBLD CREATININE-BSD FMLA CKD-EPI: 95 ML/MIN/1.73 M 2
GLOBULIN SER CALC-MCNC: 3.2 G/DL (ref 1.9–3.5)
GLUCOSE SERPL-MCNC: 201 MG/DL (ref 65–99)
HCG SERPL QL: NEGATIVE
HCT VFR BLD AUTO: 43.7 % (ref 37–47)
HGB BLD-MCNC: 15.3 G/DL (ref 12–16)
IMM GRANULOCYTES # BLD AUTO: 0.17 K/UL (ref 0–0.11)
IMM GRANULOCYTES NFR BLD AUTO: 1.2 % (ref 0–0.9)
LYMPHOCYTES # BLD AUTO: 2.1 K/UL (ref 1–4.8)
LYMPHOCYTES NFR BLD: 15.2 % (ref 22–41)
MCH RBC QN AUTO: 30.2 PG (ref 27–33)
MCHC RBC AUTO-ENTMCNC: 35 G/DL (ref 33.6–35)
MCV RBC AUTO: 86.2 FL (ref 81.4–97.8)
MONOCYTES # BLD AUTO: 0.35 K/UL (ref 0–0.85)
MONOCYTES NFR BLD AUTO: 2.5 % (ref 0–13.4)
NEUTROPHILS # BLD AUTO: 11.2 K/UL (ref 2–7.15)
NEUTROPHILS NFR BLD: 80.8 % (ref 44–72)
NRBC # BLD AUTO: 0 K/UL
NRBC BLD-RTO: 0 /100 WBC
PLATELET # BLD AUTO: 398 K/UL (ref 164–446)
PMV BLD AUTO: 10.1 FL (ref 9–12.9)
POTASSIUM SERPL-SCNC: 3.9 MMOL/L (ref 3.6–5.5)
PROT SERPL-MCNC: 7.7 G/DL (ref 6–8.2)
RBC # BLD AUTO: 5.07 M/UL (ref 4.2–5.4)
SODIUM SERPL-SCNC: 137 MMOL/L (ref 135–145)
TROPONIN T SERPL-MCNC: 6 NG/L (ref 6–19)
TROPONIN T SERPL-MCNC: <6 NG/L (ref 6–19)
WBC # BLD AUTO: 13.9 K/UL (ref 4.8–10.8)

## 2022-12-18 PROCEDURE — 99284 EMERGENCY DEPT VISIT MOD MDM: CPT

## 2022-12-18 PROCEDURE — 84703 CHORIONIC GONADOTROPIN ASSAY: CPT

## 2022-12-18 PROCEDURE — 700111 HCHG RX REV CODE 636 W/ 250 OVERRIDE (IP): Performed by: STUDENT IN AN ORGANIZED HEALTH CARE EDUCATION/TRAINING PROGRAM

## 2022-12-18 PROCEDURE — 84484 ASSAY OF TROPONIN QUANT: CPT

## 2022-12-18 PROCEDURE — 96374 THER/PROPH/DIAG INJ IV PUSH: CPT

## 2022-12-18 PROCEDURE — 85025 COMPLETE CBC W/AUTO DIFF WBC: CPT

## 2022-12-18 PROCEDURE — 93005 ELECTROCARDIOGRAM TRACING: CPT | Performed by: STUDENT IN AN ORGANIZED HEALTH CARE EDUCATION/TRAINING PROGRAM

## 2022-12-18 PROCEDURE — 80053 COMPREHEN METABOLIC PANEL: CPT

## 2022-12-18 PROCEDURE — 71045 X-RAY EXAM CHEST 1 VIEW: CPT

## 2022-12-18 PROCEDURE — 85379 FIBRIN DEGRADATION QUANT: CPT

## 2022-12-18 PROCEDURE — 36415 COLL VENOUS BLD VENIPUNCTURE: CPT

## 2022-12-18 PROCEDURE — 93005 ELECTROCARDIOGRAM TRACING: CPT

## 2022-12-18 RX ORDER — KETOROLAC TROMETHAMINE 30 MG/ML
15 INJECTION, SOLUTION INTRAMUSCULAR; INTRAVENOUS ONCE
Status: COMPLETED | OUTPATIENT
Start: 2022-12-18 | End: 2022-12-18

## 2022-12-18 RX ORDER — METHOCARBAMOL 750 MG/1
750 TABLET, FILM COATED ORAL 4 TIMES DAILY
Qty: 120 TABLET | Refills: 0 | Status: SHIPPED | OUTPATIENT
Start: 2022-12-18 | End: 2023-01-24

## 2022-12-18 RX ADMIN — KETOROLAC TROMETHAMINE 15 MG: 30 INJECTION, SOLUTION INTRAMUSCULAR; INTRAVENOUS at 03:43

## 2022-12-18 NOTE — ED TRIAGE NOTES
"Chief Complaint   Patient presents with   • Chest Pain     Began 0015, mid chest intermittent \"spasm\" tightening 9/10 pain, began randomly no trigger, worse with coughing, radiates to head   • Back Pain     Low back pain began same time, hx back spasm/pain         Ambulated to triage with partner for above complaint. Began taking new meds yesterday.    Chest pain protocols ordered, EKG completed in triage with x2 staff. Pt brought to [facility] office for blood draw. Pt educated of triage process and informed to contact staff if situation changes.    BP on L arm classifying pt for code aorta, charge updated and pt roomed to red 4.    BP (!) 187/108   Pulse (!) 137   Temp 36.7 °C (98.1 °F) (Temporal)   Resp 17   Ht 1.676 m (5' 6\")   Wt 98.9 kg (218 lb)   LMP 12/05/2022 (Exact Date)   SpO2 97%   BMI 35.19 kg/m²      "

## 2022-12-18 NOTE — DISCHARGE INSTRUCTIONS
Return to the Emergency Department immediately should you become worse in any way (i.e. difficulty breathing, new or recurrent or worsening pain, new arm, jaw or back pain, sweatiness, nausea/ vomiting, development of back pain) or if you have any new or acute concerns.      Please follow-up with your primary doctor to follow-up on your high blood pressure & your high blood sugar

## 2022-12-18 NOTE — ED PROVIDER NOTES
"ED Provider Note    CHIEF COMPLAINT  Chief Complaint   Patient presents with    Chest Pain     Began 0015, mid chest intermittent \"spasm\" tightening 9/10 pain, began randomly no trigger, worse with coughing, radiates to head    Back Pain     Low back pain began same time, hx back spasm/pain        HPI  Miriam Cabrera is a 37 y.o. female who presents with chest pain.  She states around midnight she was just sitting laying on the couch when she had sudden onset of mid sternal chest spasm.  She described it as severe, 9 out of 10 in severity.  Lasted only for a couple seconds, it would go away completely and then return without clear precipitating event. She denies any aggravation or alleviating with changes in position. Onset was not with exertion and she denies any  worsening with exertion. She said that she then had spasms in her lower back and seem to be in different places around her body even in her right groin area. She also had spasms in her neck which radiated into her head. She had no diaphoresis or vomiting but was nauseated.  She said that when she coughs she had spasms all throughout her body.  She currently denies any chest pain.  She did not take any medications prior to arrival.  He has been recently ill with a viral URI and was prescribed Augmentin, steroids and Tessalon Pearls from urgent care yesterday.  She denies any fevers or chills and states that she was having coughing, upper respiratory symptoms.  She denies any cardiac history.  She has previously been on medication for hypertension but was taken off due to improvement.  Her father had a cardiac bypass at less than 65 years old.  She denies any known history of aortic disease in her family.  She herself has no history of Marfan syndrome, connective tissue disorder.  She has no ripping or tearing nature of the pain.  Patient denies any recent prolonged periods of immobilization (including hospitalization, long plane flight or car ride), no " "recent surgery, no recent traumatic injury, hemoptysis, or history of malignancy, DVT, PE or smoking.  Denies unilateral leg swelling. She is on birth control.   She reports drinking heavily two night ago but is not a daily drinker. She does not smoke.     REVIEW OF SYSTEMS  As per HPI, otherwise a 10 point review of systems is negative    PAST MEDICAL HISTORY  History reviewed. No pertinent past medical history.    SOCIAL HISTORY  Social History     Tobacco Use    Smoking status: Never    Smokeless tobacco: Never   Vaping Use    Vaping Use: Never used   Substance Use Topics    Alcohol use: Not Currently     Comment: socially    Drug use: Never       SURGICAL HISTORY  History reviewed. No pertinent surgical history.    CURRENT MEDICATIONS  Home Medications       Reviewed by Odilia Smith R.N. (Registered Nurse) on 12/18/22 at 0055  Med List Status: Partial     Medication Last Dose Status   amoxicillin-clavulanate (AUGMENTIN) 875-125 MG Tab  Active   benzonatate (TESSALON) 100 MG Cap  Active   methylPREDNISolone (MEDROL DOSEPAK) 4 MG Tablet Therapy Pack  Active   norethindrone (MICRONOR) 0.35 MG tablet  Active                    ALLERGIES  No Known Allergies    PHYSICAL EXAM  VITAL SIGNS: BP (!) 168/101   Pulse 84   Temp 36.2 °C (97.2 °F) (Temporal)   Resp 20   Ht 1.676 m (5' 6\")   Wt 98.9 kg (218 lb)   LMP 12/05/2022 (Exact Date)   SpO2 95%   BMI 35.19 kg/m²    Constitutional: Awake and alert . No distress sitting in bed comfortably   HENT: Normal inspection  . Moist mucous membranes  Eyes: Normal inspection  Neck: Grossly normal range of motion.  Cardiovascular: Tachycardic heart rate, Normal rhythm.  Symmetric peripheral pulses.   Thorax & Lungs: No respiratory distress, No wheezing, No rales, No rhonchi, No chest tenderness.   Abdomen: Soft, non-distended, nontender, no mass  Skin: No obvious rash.  Back: No tenderness, No CVA tenderness.   Extremities: Warm, well perfused. No clubbing, cyanosis, " edema,   Neurologic: Grossly normal   Psychiatric: Normal for situation    RADIOLOGY/PROCEDURES  DX-CHEST-PORTABLE (1 VIEW)   Final Result      No radiographic evidence of acute cardiopulmonary process.           Imaging is interpreted by radiologist    Labs:  Results for orders placed or performed during the hospital encounter of 12/18/22   CBC with Differential   Result Value Ref Range    WBC 13.9 (H) 4.8 - 10.8 K/uL    RBC 5.07 4.20 - 5.40 M/uL    Hemoglobin 15.3 12.0 - 16.0 g/dL    Hematocrit 43.7 37.0 - 47.0 %    MCV 86.2 81.4 - 97.8 fL    MCH 30.2 27.0 - 33.0 pg    MCHC 35.0 33.6 - 35.0 g/dL    RDW 39.9 35.9 - 50.0 fL    Platelet Count 398 164 - 446 K/uL    MPV 10.1 9.0 - 12.9 fL    Neutrophils-Polys 80.80 (H) 44.00 - 72.00 %    Lymphocytes 15.20 (L) 22.00 - 41.00 %    Monocytes 2.50 0.00 - 13.40 %    Eosinophils 0.10 0.00 - 6.90 %    Basophils 0.20 0.00 - 1.80 %    Immature Granulocytes 1.20 (H) 0.00 - 0.90 %    Nucleated RBC 0.00 /100 WBC    Neutrophils (Absolute) 11.20 (H) 2.00 - 7.15 K/uL    Lymphs (Absolute) 2.10 1.00 - 4.80 K/uL    Monos (Absolute) 0.35 0.00 - 0.85 K/uL    Eos (Absolute) 0.01 0.00 - 0.51 K/uL    Baso (Absolute) 0.03 0.00 - 0.12 K/uL    Immature Granulocytes (abs) 0.17 (H) 0.00 - 0.11 K/uL    NRBC (Absolute) 0.00 K/uL   Complete Metabolic Panel (CMP)   Result Value Ref Range    Sodium 137 135 - 145 mmol/L    Potassium 3.9 3.6 - 5.5 mmol/L    Chloride 101 96 - 112 mmol/L    Co2 20 20 - 33 mmol/L    Anion Gap 16.0 7.0 - 16.0    Glucose 201 (H) 65 - 99 mg/dL    Bun 14 8 - 22 mg/dL    Creatinine 0.82 0.50 - 1.40 mg/dL    Calcium 9.5 8.5 - 10.5 mg/dL    AST(SGOT) 27 12 - 45 U/L    ALT(SGPT) 42 2 - 50 U/L    Alkaline Phosphatase 51 30 - 99 U/L    Total Bilirubin 0.2 0.1 - 1.5 mg/dL    Albumin 4.5 3.2 - 4.9 g/dL    Total Protein 7.7 6.0 - 8.2 g/dL    Globulin 3.2 1.9 - 3.5 g/dL    A-G Ratio 1.4 g/dL   Troponins NOW   Result Value Ref Range    Troponin T <6 6 - 19 ng/L   HCG Qual Serum   Result  Value Ref Range    Beta-Hcg Qualitative Serum Negative Negative   D-DIMER   Result Value Ref Range    D-Dimer Screen <0.27 0.00 - 0.50 ug/mL (FEU)   CORRECTED CALCIUM   Result Value Ref Range    Correct Calcium 9.1 8.5 - 10.5 mg/dL   ESTIMATED GFR   Result Value Ref Range    GFR (CKD-EPI) 95 >60 mL/min/1.73 m 2   TROPONIN   Result Value Ref Range    Troponin T 6 6 - 19 ng/L   EKG (NOW)   Result Value Ref Range    Report       [facility]    Test Date:  2022  Pt Name:    MELINDA THOMAS              Department: ER  MRN:        [id number]                      Room:  Gender:     Female                       Technician: [id number]  :        1985                   Requested By:ER TRIAGE PROTOCOL  Order #:    [id number]                    Reading MD:    Measurements  Intervals                                Axis  Rate:       103                          P:          35  CA:         137                          QRS:        29  QRSD:       93                           T:          0  QT:         348  QTc:        456    Interpretive Statements  Sinus tachycardia  Probable left atrial enlargement  Borderline T abnormalities, inferior leads  Compared to ECG 2019 13:39:43  T-wave abnormality now present  Sinus rhythm no longer present  Inferior Q waves no longer present  Q waves no longer present     EKG   Result Value Ref Range    Report       [facility]    Test Date:  2022  Pt Name:    MELINDA THOMAS              Department: ER  MRN:        [id number]                      Room:       RD 04  Gender:     Female                       Technician:  :        1985                   Requested By:ALEXANDRA COPELAND  Order #:    [id number]                    Reading MD:    Measurements  Intervals                                Axis  Rate:       76                           P:          31  CA:         123                          QRS:         33  QRSD:       93                           T:          10  QT:         372  QTc:        419    Interpretive Statements  Sinus rhythm  Abnormal inferior Q waves  Compared to ECG 12/18/2022 00:48:14  Inferior Q waves now present  Q waves now present  Sinus tachycardia no longer present  T-wave abnormality no longer present       EKG #1   Tachycardic rate, normal rhythm, normal axis.  No ST wave elevations or depressions.  She does have inferior Q waves.  No abnormal intervals.  Wave abnormality in inferior leads remains present.    EKG#2  More rate, normal rhythm, normal axis.  No ST wave elevations or depressions.  She has persistent inferior Q waves.  T wave abnormality in inferior leads remains present      Medications   ketorolac (TORADOL) injection 15 mg (15 mg Intravenous Given 12/18/22 0343)       Measures:  HTN/IDDM FOLLOW UP:  The patient is referred to a primary physician for blood pressure management, diabetic screening, and for all other preventive health concerns    COURSE & MEDICAL DECISION MAKING    Patient is a 37 y.o. female, who presents to the Emergency Department with chest pain. She was also having spasms in her back and neck.     Differential diagnoses include, but not limited to: ACS; chest pain of noncardiac origin; CHF; pneumothorax; pulmonary edema; pleural effusion; arrhythmia; pneumonia; pulmonary embolus; aortic/thoracic dissection; costochondritis; pericarditis; myocarditis; and GERD/PUD.    Patient arrived hemodynamically stable, afebrile, and in good condition.  She was having chest pain at the time of arrival but on my assessment actually denied any ongoing symptoms.  Patient was placed on telemetry. Physical examination detailed above and without any clinical signs of CHF or neurological deficit.  Labs reviewed above and notable for negative troponin, leukocytosis of 13.9, negative d-dimer, elevated blood glucose.  ECG without ST elevations, ST depressions, new LBBB or other obvious  signs of ischemia; or signs of pericarditis. She has non specific T wave changes and q waves, as above.  CXR notable for no evidence of pneumothorax, consolidation, pleural effusion, pulmonary edema or widened mediastinum.  Repeat ECG reassuring and no dynamic changes.  Repeat troponin remains within normal limits.    Upon reevaluation, patient continued to deny any symptoms. Her tachycardia resolved without intervention.     I believe this patient can be ruled out for acute coronary syndrome because during today's visit has had two high-sensitivity troponin and a non-ischemic ECG. Troponin has been obtained in excess of 3 hours after the onset of patient's pain. Patient also with a Heart Score of 2.   ECG without findings to suggest pericarditis.  Myocarditis unlikely given negative troponin. Pulmonary embolus unlikely given negative d dimer.  Aortic/thoracic dissection less likely as patient has been hemodynamically stable, had no widened mediastinum on CXR, and on exam with equal pulses and no neurological deficits. Furthermore her back and neck pain episodes were distinct from her chest pain. She did not have chest pain that radiated into her back.     It is unclear to me, exactly the etiology of her pain. She had one additional episode of what she describes as spasms during her time in the ER and at that point a repeat EKG was obtained which was unchanged.  She remained asymptomatic here for hours after that.  Of note, I was initially unaware that patient was triaged as a 'code aorta'.  This was due to elevated and discrepant blood pressures.  Per her nurse this was not persistent after she was roomed. I explained at length to the patient that I had not done in a CTA to rule out aortic pathology.  She does not have any known risk factors other than being hypertensive here.  I also feel reassured that she has been pain free and has remained stable during her stay in the emergency department.  I did however  explain that per this finding, I recommended obtaining a CT scan prior to discharge but she declined stating that she felt better and she prefers to return if worsening. She is very reasonable and I think that this is an appropriate plan.  Her  who is with her is agreeable and will bring her back immediately if she worsens.  I explicitly explained that in the setting of diagnostic uncertainty today that they should have a low threshold to return.    I also spoke at length with the patient and her  about my concern over her high blood pressure and her elevated blood glucose today.  She should follow-up with her primary doctor in order to have these further addressed.  She also might need further cardiac testing .  Patient understands that I want them to return to the Emergency Department immediately should they become worse in any way (i.e. difficulty breathing, new or recurrent or worsening pain, new arm, jaw or back pain, sweatiness, nausea/ vomiting, development of back pain) or if they have any concerns.    Strict return precautions and discharge and follow-up instructions detailed in patient's discharge instructions.      FINAL IMPRESSION  1. Other chest pain Acute methocarbamol (ROBAXIN) 750 MG Tab      2. Neck muscle spasm Acute methocarbamol (ROBAXIN) 750 MG Tab      3. Elevated blood pressure reading        4. Blood glucose elevated Acute               This dictation was created using voice recognition software. The accuracy of the dictation is limited to the abilities of the software.  The nursing notes were reviewed and certain aspects of this information were incorporated into this note.      Electronically signed by: Latha Damian M.D., 12/18/2022 1:59 AM

## 2022-12-18 NOTE — ED NOTES
PIV d/c. Pt provided d/c instructions and verbalizes understanding with no further questions. Rx sent to pt's requested pharmacy. Home care instructions explained.

## 2022-12-18 NOTE — ED NOTES
Pt ambulated to Red 4 with steady gait. Pt changed into a gown and placed on the monitor. PIV initiated, blood collected and sent to the lab

## 2022-12-18 NOTE — ED NOTES
Pt medicated per MAR. Repeat trop collected and sent to the lab. Pt updated on POC and has no additional requests at this time

## 2023-01-23 ENCOUNTER — OFFICE VISIT (OUTPATIENT)
Dept: URGENT CARE | Facility: CLINIC | Age: 38
End: 2023-01-23
Payer: COMMERCIAL

## 2023-01-23 VITALS
HEIGHT: 65 IN | BODY MASS INDEX: 36.62 KG/M2 | DIASTOLIC BLOOD PRESSURE: 78 MMHG | WEIGHT: 219.8 LBS | SYSTOLIC BLOOD PRESSURE: 152 MMHG | OXYGEN SATURATION: 99 % | RESPIRATION RATE: 16 BRPM | HEART RATE: 90 BPM | TEMPERATURE: 97.1 F

## 2023-01-23 DIAGNOSIS — J01.00 ACUTE NON-RECURRENT MAXILLARY SINUSITIS: ICD-10-CM

## 2023-01-23 PROCEDURE — 99213 OFFICE O/P EST LOW 20 MIN: CPT | Performed by: NURSE PRACTITIONER

## 2023-01-23 RX ORDER — DEXAMETHASONE 4 MG/1
10 TABLET ORAL ONCE
Qty: 3 TABLET | Refills: 0 | Status: SHIPPED | OUTPATIENT
Start: 2023-01-23 | End: 2023-01-23 | Stop reason: SDUPTHER

## 2023-01-23 RX ORDER — DEXAMETHASONE 4 MG/1
10 TABLET ORAL ONCE
Qty: 3 TABLET | Refills: 0 | Status: SHIPPED | OUTPATIENT
Start: 2023-01-23 | End: 2023-01-23

## 2023-01-23 RX ORDER — DOXYCYCLINE HYCLATE 100 MG
100 TABLET ORAL 2 TIMES DAILY
Qty: 14 TABLET | Refills: 0 | Status: SHIPPED | OUTPATIENT
Start: 2023-01-23 | End: 2023-01-23 | Stop reason: SDUPTHER

## 2023-01-23 RX ORDER — LEVONORGESTREL AND ETHINYL ESTRADIOL 0.1-0.02MG
KIT ORAL
COMMUNITY
Start: 2022-12-28 | End: 2023-05-22

## 2023-01-23 RX ORDER — DOXYCYCLINE HYCLATE 100 MG
100 TABLET ORAL 2 TIMES DAILY
Qty: 14 TABLET | Refills: 0 | Status: SHIPPED | OUTPATIENT
Start: 2023-01-23 | End: 2023-01-30

## 2023-01-23 ASSESSMENT — FIBROSIS 4 INDEX: FIB4 SCORE: 0.38

## 2023-01-24 ASSESSMENT — ENCOUNTER SYMPTOMS
SINUS PRESSURE: 1
VOMITING: 0
SORE THROAT: 0
NECK PAIN: 0
NAUSEA: 0
FEVER: 0
MYALGIAS: 0
COUGH: 1
SINUS PAIN: 1
HEADACHES: 1
SHORTNESS OF BREATH: 0
EYE PAIN: 0
CHILLS: 0
DIZZINESS: 0

## 2023-01-25 NOTE — PROGRESS NOTES
Subjective:   Miriam Cabrera is a 37 y.o. female who presents for Sinus Problem (X 4 days, third time in past 45 days. Sinus pressure, congestion, deep burn in chest with coughing)      Sinus Problem  This is a recurrent problem. The current episode started more than 1 month ago (worse last 4 days). There has been no fever. Her pain is at a severity of 6/10. The pain is moderate. Associated symptoms include congestion, coughing, headaches and sinus pressure. Pertinent negatives include no chills, ear pain, neck pain, shortness of breath or sore throat. Past treatments include acetaminophen. The treatment provided no relief.     Review of Systems   Constitutional:  Positive for malaise/fatigue. Negative for chills and fever.   HENT:  Positive for congestion, sinus pressure and sinus pain. Negative for ear pain and sore throat.    Eyes:  Negative for pain.   Respiratory:  Positive for cough. Negative for shortness of breath.    Cardiovascular:  Negative for chest pain.   Gastrointestinal:  Negative for nausea and vomiting.   Genitourinary:  Negative for hematuria.   Musculoskeletal:  Negative for myalgias and neck pain.   Skin:  Negative for rash.   Neurological:  Positive for headaches. Negative for dizziness.     Medications:    DAYQUIL PO  doxycycline Tabs  Lutera Tabs    Allergies: Patient has no known allergies.    Problem List: Miriam Cabrera does not have a problem list on file.    Surgical History:  No past surgical history on file.    Past Social Hx: Miriam Cabrera  reports that she has never smoked. She has never used smokeless tobacco. She reports current alcohol use. She reports that she does not use drugs.     Past Family Hx:  Miriam Cabrera family history is not on file.     Problem list, medications, and allergies reviewed by myself today in Epic.     Objective:     BP (!) 152/78 (BP Location: Left arm, Patient Position: Sitting, BP Cuff Size: Large adult long)   Pulse 90   Temp 36.2 °C  "(97.1 °F) (Temporal)   Resp 16   Ht 1.658 m (5' 5.28\")   Wt 99.7 kg (219 lb 12.8 oz)   LMP 12/23/2022 (Approximate)   SpO2 99%   BMI 36.27 kg/m²     Physical Exam  Vitals and nursing note reviewed.   Constitutional:       General: She is not in acute distress.     Appearance: She is well-developed.   HENT:      Head: Normocephalic and atraumatic.      Right Ear: Tympanic membrane and external ear normal.      Left Ear: Tympanic membrane and external ear normal.      Nose: Congestion present.      Right Sinus: Maxillary sinus tenderness present. No frontal sinus tenderness.      Left Sinus: Maxillary sinus tenderness present. No frontal sinus tenderness.      Mouth/Throat:      Mouth: Mucous membranes are moist.      Pharynx: Uvula midline. No posterior oropharyngeal erythema.      Tonsils: No tonsillar exudate or tonsillar abscesses.   Eyes:      General:         Right eye: No discharge.         Left eye: No discharge.      Conjunctiva/sclera: Conjunctivae normal.   Cardiovascular:      Rate and Rhythm: Normal rate.   Pulmonary:      Effort: Pulmonary effort is normal. No respiratory distress.      Breath sounds: Normal breath sounds.   Abdominal:      General: There is no distension.   Musculoskeletal:         General: Normal range of motion.   Skin:     General: Skin is warm and dry.   Neurological:      General: No focal deficit present.      Mental Status: She is alert and oriented to person, place, and time. Mental status is at baseline.      Gait: Gait (gait at baseline) normal.   Psychiatric:         Judgment: Judgment normal.       Assessment/Plan:     Diagnosis and associated orders:     1. Acute non-recurrent maxillary sinusitis  doxycycline (VIBRAMYCIN) 100 MG Tab    dexamethasone (DECADRON) 4 MG Tab    DISCONTINUED: doxycycline (VIBRAMYCIN) 100 MG Tab    DISCONTINUED: dexamethasone (DECADRON) 4 MG Tab         Comments/MDM:     I personally reviewed prior external notes and prior test results " pertinent to today's visit.   Discussed management options, risks and benefits, and alternatives to treatment plan agreed upon.   Red flags discussed and indications to immediately call 911 or present to the Emergency Department.   Supportive care, differential diagnoses, and indications for immediate follow-up discussed with patient.    Patient expresses understanding and agrees to plan. Patient denies any other questions or concerns.              Please note that this dictation was created using voice recognition software. I have made a reasonable attempt to correct obvious errors, but I expect that there are errors of grammar and possibly content that I did not discover before finalizing the note.    This note was electronically signed by Thomas AVITIA.

## 2023-05-22 ENCOUNTER — OFFICE VISIT (OUTPATIENT)
Dept: URGENT CARE | Facility: CLINIC | Age: 38
End: 2023-05-22
Payer: COMMERCIAL

## 2023-05-22 VITALS
BODY MASS INDEX: 36.99 KG/M2 | HEART RATE: 83 BPM | TEMPERATURE: 97.6 F | WEIGHT: 222 LBS | OXYGEN SATURATION: 98 % | HEIGHT: 65 IN | DIASTOLIC BLOOD PRESSURE: 92 MMHG | SYSTOLIC BLOOD PRESSURE: 152 MMHG | RESPIRATION RATE: 16 BRPM

## 2023-05-22 DIAGNOSIS — J32.9 BACTERIAL SINUSITIS: ICD-10-CM

## 2023-05-22 DIAGNOSIS — R03.0 ELEVATED BLOOD PRESSURE READING: ICD-10-CM

## 2023-05-22 DIAGNOSIS — B96.89 BACTERIAL SINUSITIS: ICD-10-CM

## 2023-05-22 DIAGNOSIS — J04.0 LARYNGITIS: ICD-10-CM

## 2023-05-22 DIAGNOSIS — J02.9 SORE THROAT: ICD-10-CM

## 2023-05-22 DIAGNOSIS — R50.9 FEVER IN ADULT: ICD-10-CM

## 2023-05-22 DIAGNOSIS — H92.01 ACUTE OTALGIA, RIGHT: ICD-10-CM

## 2023-05-22 PROCEDURE — 3080F DIAST BP >= 90 MM HG: CPT | Performed by: NURSE PRACTITIONER

## 2023-05-22 PROCEDURE — 99214 OFFICE O/P EST MOD 30 MIN: CPT | Performed by: NURSE PRACTITIONER

## 2023-05-22 PROCEDURE — 3077F SYST BP >= 140 MM HG: CPT | Performed by: NURSE PRACTITIONER

## 2023-05-22 RX ORDER — CEFDINIR 300 MG/1
300 CAPSULE ORAL 2 TIMES DAILY
Qty: 14 CAPSULE | Refills: 0 | Status: SHIPPED | OUTPATIENT
Start: 2023-05-22 | End: 2023-05-29

## 2023-05-22 ASSESSMENT — FIBROSIS 4 INDEX: FIB4 SCORE: 0.38

## 2023-05-22 NOTE — PROGRESS NOTES
"Subjective:   Miriam Cabrera is a 37 y.o. female who presents for Nasal Congestion (X 1 wk ), Ear Pain (X 1 wk ), Fever, Fatigue, and Sinus Problem (X 1 wk )       HPI  Patient presents for evaluation of 1 week history of nasal congestions, sinus pressure, bilateral otalgia, low grade fever, and fatigue. She has tried several OTC medications with minimal relief.  Has known seasonal allergic rhinitis, takes zyrtec daily.    ROS  All other systems are negative except as documented above within HPI.    MEDS:   Current Outpatient Medications:     LUTERA 0.1-20 MG-MCG per tablet, , Disp: , Rfl:     Pseudoephedrine-APAP-DM (DAYQUIL PO), Take  by mouth. (Patient not taking: Reported on 5/22/2023), Disp: , Rfl:   ALLERGIES: No Known Allergies    Patient's PMH, SocHx, SurgHx, FamHx, Drug allergies and medications were reviewed.     Objective:   BP (!) 152/92   Pulse 83   Temp 36.4 °C (97.6 °F)   Resp 16   Ht 1.651 m (5' 5\")   Wt 101 kg (222 lb)   SpO2 98%   BMI 36.94 kg/m²     Physical Exam  Vitals and nursing note reviewed.   Constitutional:       General: She is awake.      Appearance: Normal appearance. She is well-developed.   HENT:      Head: Normocephalic and atraumatic.      Right Ear: Tympanic membrane, ear canal and external ear normal.      Left Ear: Tympanic membrane, ear canal and external ear normal.      Nose: Nasal tenderness, mucosal edema, congestion and rhinorrhea present.      Right Turbinates: Enlarged.      Left Turbinates: Enlarged.      Right Sinus: Maxillary sinus tenderness present.      Left Sinus: Maxillary sinus tenderness present.      Mouth/Throat:      Lips: Pink.      Mouth: Mucous membranes are moist.      Pharynx: Oropharynx is clear. Uvula midline.   Eyes:      Extraocular Movements: Extraocular movements intact.      Conjunctiva/sclera: Conjunctivae normal.   Cardiovascular:      Rate and Rhythm: Normal rate and regular rhythm.      Pulses: Normal pulses.      Heart sounds: " Normal heart sounds.   Pulmonary:      Effort: Pulmonary effort is normal.      Breath sounds: Normal breath sounds.   Musculoskeletal:         General: Normal range of motion.      Cervical back: Normal range of motion and neck supple.   Skin:     General: Skin is warm and dry.   Neurological:      General: No focal deficit present.      Mental Status: She is alert and oriented to person, place, and time.   Psychiatric:         Mood and Affect: Mood normal.         Behavior: Behavior normal. Behavior is cooperative.         Thought Content: Thought content normal.         Judgment: Judgment normal.       Assessment/Plan:   Assessment    1. Laryngitis  - cefdinir (OMNICEF) 300 MG Cap; Take 1 Capsule by mouth 2 times a day for 7 days.  Dispense: 14 Capsule; Refill: 0    2. Bacterial sinusitis  - cefdinir (OMNICEF) 300 MG Cap; Take 1 Capsule by mouth 2 times a day for 7 days.  Dispense: 14 Capsule; Refill: 0    3. Elevated blood pressure reading    4. Sore throat    5. Fever in adult    6. Acute otalgia, right      Vital signs stable at today's acute urgent care visit.  Begin medications as listed.    Advised the patient to follow-up with the primary care provider/urgent care if symptoms persist.  Red flags discussed and indications to immediately call 911 or present to the ED. All questions were encouraged and answered to the patient's satisfaction and understanding, and they agree to the plan of care.     This is an acute problem with uncertain prognosis, medication management and instructions as well as management options were provided.  I personally reviewed prior external notes and test results pertinent to today and independently reviewed and interpreted all diagnostics, to include POC testing. Time spent evaluating this patient includes preparing for visit, counseling/education, exam, evaluation, obtaining history, and ordering lab/test/procedures.      Please note that this dictation was created using voice  recognition software. I have made a reasonable attempt to correct obvious errors, but I expect that there are errors of grammar and possibly content that I did not discover before finalizing the note.

## 2023-05-22 NOTE — LETTER
May 22, 2023    To Whom It May Concern:         This is confirmation that Miriam Cabrera attended her scheduled appointment with AMPARO Sher on 5/22/23. Please excuse her from work on the dates of 5/19 and 5/22 due to an acute illness.         If you have any questions please do not hesitate to call me at the phone number listed below.    Sincerely,          MARIKA SherRSVETLANA.  [phone]

## 2023-07-22 ENCOUNTER — OFFICE VISIT (OUTPATIENT)
Dept: URGENT CARE | Facility: CLINIC | Age: 38
End: 2023-07-22
Payer: COMMERCIAL

## 2023-07-22 VITALS
RESPIRATION RATE: 16 BRPM | WEIGHT: 219.5 LBS | OXYGEN SATURATION: 96 % | HEART RATE: 95 BPM | DIASTOLIC BLOOD PRESSURE: 82 MMHG | HEIGHT: 66 IN | SYSTOLIC BLOOD PRESSURE: 124 MMHG | BODY MASS INDEX: 35.27 KG/M2 | TEMPERATURE: 97.5 F

## 2023-07-22 DIAGNOSIS — T78.40XA ALLERGIC REACTION, INITIAL ENCOUNTER: ICD-10-CM

## 2023-07-22 PROCEDURE — 99212 OFFICE O/P EST SF 10 MIN: CPT

## 2023-07-22 PROCEDURE — 3079F DIAST BP 80-89 MM HG: CPT

## 2023-07-22 PROCEDURE — 3074F SYST BP LT 130 MM HG: CPT

## 2023-07-22 RX ORDER — PREDNISONE 20 MG/1
40 TABLET ORAL DAILY
Qty: 10 TABLET | Refills: 0 | Status: SHIPPED | OUTPATIENT
Start: 2023-07-22 | End: 2023-07-27

## 2023-07-22 ASSESSMENT — FIBROSIS 4 INDEX: FIB4 SCORE: 0.38

## 2023-07-23 NOTE — PROGRESS NOTES
Chief Complaint   Patient presents with    Rash     Mid thigh to chest rash X today itchiness, redness        HISTORY OF PRESENT ILLNESS: Patient is a pleasant 37 y.o. female who presents to urgent care today patient was rafting in the river today when she noted a cute onset of a rash to her chest thighs and arms.  She states she felt mildly short of breath.  She got out of the river, washed in the shower and applied chamomile and hydrocortisone creams.  She notes the rash has slightly improved.  No previous history noted otherwise.    There are no problems to display for this patient.      Allergies:Patient has no known allergies.    Current Outpatient Medications Ordered in Epic   Medication Sig Dispense Refill    predniSONE (DELTASONE) 20 MG Tab Take 2 Tablets by mouth every day for 5 days. 10 Tablet 0     No current Epic-ordered facility-administered medications on file.       No past medical history on file.    Social History     Tobacco Use    Smoking status: Never    Smokeless tobacco: Never   Vaping Use    Vaping Use: Never used   Substance Use Topics    Alcohol use: Yes     Comment: socially maybe once a month if that    Drug use: Never       No family status information on file.   No family history on file.    ROS:  Review of Systems   Constitutional: Negative for fever, chills, weight loss, malaise, and fatigue.   HENT: Negative for ear pain, nosebleeds, congestion, sore throat and neck pain.    Eyes: Negative for vision changes.   Neuro: Negative for headache, sensory changes, weakness, seizure, LOC.   Cardiovascular: Negative for chest pain, palpitations, orthopnea and leg swelling.   Respiratory: Negative for cough, sputum production, shortness of breath and wheezing.   Gastrointestinal: Negative for abdominal pain, nausea, vomiting or diarrhea.   Genitourinary: Negative for dysuria, urgency and frequency.  Musculoskeletal: Negative for falls, neck pain, back pain, joint pain, myalgias.   Skin:  "Positive for rash, negative diaphoresis.     Exam:  /82 (BP Location: Left arm, Patient Position: Sitting, BP Cuff Size: Large adult long)   Pulse 95   Temp 36.4 °C (97.5 °F) (Temporal)   Resp 16   Ht 1.676 m (5' 6\")   Wt 99.6 kg (219 lb 8 oz)   SpO2 96%   General: well-nourished, well-developed female in NAD  Head: normocephalic, atraumatic  Eyes: PERRLA, no conjunctival injection, acuity grossly intact, lids normal.  Ears: normal shape and symmetry, no tenderness, no discharge. External canals are without any significant edema or erythema. Tympanic membranes are without any inflammation, no effusion. Gross auditory acuity is intact.  Nose: symmetrical without tenderness, no discharge.  Mouth/Throat: reasonable hygiene, no erythema, exudates or tonsillar enlargement.  Neck: no masses, range of motion within normal limits, no tracheal deviation. No obvious thyroid enlargement.   Lymph: no cervical adenopathy. No supraclavicular adenopathy.   Neuro: alert and oriented. Cranial nerves 1-12 grossly intact. No sensory deficit.   Cardiovascular: regular rate and rhythm. No edema.  Pulmonary: no distress. Chest is symmetrical with respiration, no wheezes, crackles, or rhonchi.  No noted impairment of speech, lung sounds are currently clear to auscultation.  Abdomen: soft, non-tender, no guarding, no hepatosplenomegaly.  Musculoskeletal: no clubbing, appropriate muscle tone, gait is stable.  Skin: warm, dry, intact, no clubbing, no cyanosis, noted hives and rash to bilateral upper arms as well as the patient's chest.  No major redness or swelling.  Psych: appropriate mood, affect, judgement.         Assessment/Plan:  1. Allergic reaction, initial encounter  predniSONE (DELTASONE) 20 MG Tab      This is a 37-year-old female who was in the river today swimming when she noted a cute onset of generalized rash along with some shortness of breath.  Physical exam lung fields are clear to auscultation.  Vitals are " currently stable oxygen saturation is 96%.  No impairment of speech.  She does have a notable generalized rash in the form of hives on her arms and chest.  Patient placed on prednisone, encouraged to continue to use the chamomile cream as well as hydrocortisone cream that she placed on the rash prior to arrival.  Patient states the rash is improving.  Patient advised of the plan of care, advised that if she at all becomes any significantly short of breath or the rash continues to get worse to please seek further evaluation.  Benadryl encouraged as well.      Supportive care, differential diagnoses, and indications for immediate follow-up discussed with patient.   Pathogenesis of diagnosis discussed including typical length and natural progression.   Instructed to return to clinic or nearest emergency department for any change in condition, further concerns, or worsening of symptoms.  Patient states understanding of the plan of care and discharge instructions.  Instructed to make an appointment, for follow up, with  primary care provider.    Please note that this dictation was created using voice recognition software. I have made every reasonable attempt to correct obvious errors, but I expect that there are errors of grammar and possibly content that I did not discover before finalizing the note.      Ruba AVITIA

## 2023-10-18 ENCOUNTER — OFFICE VISIT (OUTPATIENT)
Dept: URGENT CARE | Facility: CLINIC | Age: 38
End: 2023-10-18
Payer: COMMERCIAL

## 2023-10-18 VITALS
SYSTOLIC BLOOD PRESSURE: 118 MMHG | DIASTOLIC BLOOD PRESSURE: 82 MMHG | TEMPERATURE: 96.8 F | BODY MASS INDEX: 35.2 KG/M2 | WEIGHT: 219 LBS | RESPIRATION RATE: 16 BRPM | OXYGEN SATURATION: 100 % | HEIGHT: 66 IN | HEART RATE: 96 BPM

## 2023-10-18 DIAGNOSIS — J01.90 ACUTE BACTERIAL SINUSITIS: ICD-10-CM

## 2023-10-18 DIAGNOSIS — B96.89 ACUTE BACTERIAL SINUSITIS: ICD-10-CM

## 2023-10-18 LAB — S PYO DNA SPEC NAA+PROBE: NOT DETECTED

## 2023-10-18 PROCEDURE — 3074F SYST BP LT 130 MM HG: CPT | Performed by: PHYSICIAN ASSISTANT

## 2023-10-18 PROCEDURE — 99213 OFFICE O/P EST LOW 20 MIN: CPT | Performed by: PHYSICIAN ASSISTANT

## 2023-10-18 PROCEDURE — 87651 STREP A DNA AMP PROBE: CPT | Performed by: PHYSICIAN ASSISTANT

## 2023-10-18 PROCEDURE — 3079F DIAST BP 80-89 MM HG: CPT | Performed by: PHYSICIAN ASSISTANT

## 2023-10-18 RX ORDER — FLUTICASONE PROPIONATE 50 MCG
1 SPRAY, SUSPENSION (ML) NASAL DAILY
Qty: 16 G | Refills: 0 | Status: SHIPPED | OUTPATIENT
Start: 2023-10-18

## 2023-10-18 RX ORDER — AMOXICILLIN AND CLAVULANATE POTASSIUM 875; 125 MG/1; MG/1
1 TABLET, FILM COATED ORAL 2 TIMES DAILY
Qty: 14 TABLET | Refills: 0 | Status: SHIPPED | OUTPATIENT
Start: 2023-10-18 | End: 2023-10-25

## 2023-10-18 ASSESSMENT — ENCOUNTER SYMPTOMS
ABDOMINAL PAIN: 0
CHILLS: 0
NAUSEA: 0
SINUS PAIN: 0
DIZZINESS: 0
EYE DISCHARGE: 0
FEVER: 0
DIARRHEA: 0
CONSTIPATION: 0
WHEEZING: 0
EYE PAIN: 0
DIAPHORESIS: 0
EYE REDNESS: 0
SHORTNESS OF BREATH: 0
SORE THROAT: 1
HEADACHES: 0
COUGH: 1
VOMITING: 0

## 2023-10-18 ASSESSMENT — FIBROSIS 4 INDEX: FIB4 SCORE: 0.39

## 2023-10-19 NOTE — PROGRESS NOTES
"  Subjective:     Miriam Cabrera  is a 38 y.o. female who presents for Pharyngitis (X 4 weeks, burning cough earaches)       She presents today with various symptoms that been ongoing for the past 4 weeks.  Current symptoms that is most severe is a sore throat.  Is having some pain with swallowing.  She has been experiencing cough, sinus congestion, runny nose, bilateral ear pain over the past several weeks.  She denies any recent known close sick contacts.  No fevers, no chest pain or shortness of breath, no nausea vomiting, no abdominal pain, no diarrhea.  Has been using over-the-counter medications for symptoms.       Review of Systems   Constitutional:  Negative for chills, diaphoresis, fever and malaise/fatigue.   HENT:  Positive for congestion, ear pain and sore throat. Negative for ear discharge and sinus pain.    Eyes:  Negative for pain, discharge and redness.   Respiratory:  Positive for cough. Negative for shortness of breath and wheezing.    Cardiovascular:  Negative for chest pain.   Gastrointestinal:  Negative for abdominal pain, constipation, diarrhea, nausea and vomiting.   Neurological:  Negative for dizziness and headaches.      No Known Allergies  History reviewed. No pertinent past medical history.     Objective:   /82 (BP Location: Left arm, Patient Position: Sitting, BP Cuff Size: Large adult)   Pulse 96   Temp 36 °C (96.8 °F)   Resp 16   Ht 1.676 m (5' 6\")   Wt 99.3 kg (219 lb)   SpO2 100%   BMI 35.35 kg/m²   Physical Exam  Vitals and nursing note reviewed.   Constitutional:       General: She is not in acute distress.     Appearance: Normal appearance. She is not ill-appearing, toxic-appearing or diaphoretic.   HENT:      Head: Normocephalic.      Right Ear: Tympanic membrane, ear canal and external ear normal. There is no impacted cerumen.      Left Ear: Tympanic membrane, ear canal and external ear normal. There is no impacted cerumen.      Nose: No congestion or rhinorrhea. "      Mouth/Throat:      Mouth: Mucous membranes are moist.      Pharynx: No oropharyngeal exudate or posterior oropharyngeal erythema.      Comments: No tonsillar swelling, bilaterally.  No soft tissue swelling of the sublingual mucosa, no swelling of the soft or hard palate, no unilarteral oral pharynx swelling, no uvular deviation.  Eyes:      General:         Right eye: No discharge.         Left eye: No discharge.      Conjunctiva/sclera: Conjunctivae normal.   Cardiovascular:      Rate and Rhythm: Normal rate and regular rhythm.   Pulmonary:      Effort: Pulmonary effort is normal. No respiratory distress.      Breath sounds: Normal breath sounds. No stridor. No wheezing or rhonchi.   Musculoskeletal:      Cervical back: Neck supple.   Lymphadenopathy:      Cervical: No cervical adenopathy.   Neurological:      General: No focal deficit present.      Mental Status: She is alert and oriented to person, place, and time.   Psychiatric:         Mood and Affect: Mood normal.         Behavior: Behavior normal.         Thought Content: Thought content normal.         Judgment: Judgment normal.             Diagnostic testing:    Cephid Strep -negative, discussed during office visit    Assessment/Plan:     Encounter Diagnoses   Name Primary?    Acute bacterial sinusitis           Plan for care for today's complaint includes starting patient on Augmentin and Flonase for acute bacterial sinusitis.  Strep test was negative today.  Continue to monitor symptoms and return to urgent care or follow-up with primary care provider if symptoms remain ongoing.  Follow-up in the emergency department if symptoms become severe, ER precautions discussed in office today..  Prescription for Augmentin, Flonase provided.    See AVS Instructions below for written guidance provided to patient on after-visit management and care in addition to our verbal discussion during the visit.    Please note that this dictation was created using voice  recognition software. I have attempted to correct all errors, but there may be sound-alike, spelling, grammar and possibly content errors that I did not discover before finalizing the note.    Gemfestus Quintanilla PA-C

## 2024-01-02 ENCOUNTER — NON-PROVIDER VISIT (OUTPATIENT)
Dept: URGENT CARE | Facility: CLINIC | Age: 39
End: 2024-01-02

## 2024-01-02 DIAGNOSIS — Z11.1 PPD SCREENING TEST: Primary | ICD-10-CM

## 2024-01-02 PROCEDURE — 86580 TB INTRADERMAL TEST: CPT | Performed by: PHYSICIAN ASSISTANT

## 2024-01-03 NOTE — PROGRESS NOTES
Miriam Cabrera is a 38 y.o. female here for a non-provider visit for PPD placement -- Step 1 of 1    Reason for PPD:  work requirement    1. TB evaluation questionnaire completed by patient? Yes      -  If any answers marked yes did you contact a provider prior to placing? Not Indicated  2.  Patient notified to return to clinic for reading on: Thursday 1/4/24 after 8:44pm OR before Friday 1/5/24 8:44pm.  3.  PPD Placement documentation completed on TB evaluation questionnaire? YES  4.  Location of TB evaluation questionnaire filed: [facility] reception desk.

## 2024-01-05 ENCOUNTER — NON-PROVIDER VISIT (OUTPATIENT)
Dept: URGENT CARE | Facility: CLINIC | Age: 39
End: 2024-01-05

## 2024-01-05 LAB — TB WHEAL 3D P 5 TU DIAM: NORMAL MM

## 2024-01-05 PROCEDURE — 99999 PR NO CHARGE: CPT | Performed by: FAMILY MEDICINE

## 2024-01-05 NOTE — PROGRESS NOTES
Miriam Cabrera is a 38 y.o. female here for a non-provider visit for PPD reading -- Step 1 of 1.      1.  Resulted in Epic under enter/edit results? Yes   2.  TB evaluation questionnaire scanned into chart and original given to patient?Yes      3. Was induration greater than 0 mm? No.    Routed to PCP? Yes